# Patient Record
Sex: FEMALE | URBAN - METROPOLITAN AREA
[De-identification: names, ages, dates, MRNs, and addresses within clinical notes are randomized per-mention and may not be internally consistent; named-entity substitution may affect disease eponyms.]

---

## 2023-09-05 ENCOUNTER — APPOINTMENT (RX ONLY)
Dept: URBAN - METROPOLITAN AREA CLINIC 41 | Facility: CLINIC | Age: 53
Setting detail: DERMATOLOGY
End: 2023-09-05

## 2023-09-05 PROBLEM — D23.9 OTHER BENIGN NEOPLASM OF SKIN, UNSPECIFIED: Status: ACTIVE | Noted: 2023-09-05

## 2023-09-05 PROCEDURE — ? ADDITIONAL NOTES

## 2023-09-05 PROCEDURE — ? COUNSELING

## 2023-09-05 PROCEDURE — 99202 OFFICE O/P NEW SF 15 MIN: CPT

## 2023-09-05 NOTE — PROCEDURE: COUNSELING
Patient Specific Counseling (Will Not Stick From Patient To Patient): -\\nBG notes he does not recommend to get them removed since they are benign and it would leave a scar and they might come back even after removal.\\nBG notes this is not cancer, pts pcp thought it was bcc, Bg notes he does not believe it is skin cancer\\nPt defers tx today\\nBG notes there are no creams to make it go away.\\nPt asks about the scar again, Bg is clear to pt that there will be a scar and it will not go away.
Detail Level: Detailed

## 2023-09-05 NOTE — HPI: BUMPS
Is This A New Presentation, Or A Follow-Up?: Bump
Additional History: -\\nNew pt here to have bump on nose evaluated. \\nPt notes it has been present for years but has recently started to get itchy. \\nPt has tried otc moisturizers and face washes but they do not help. \\nPt declined Fbse.